# Patient Record
Sex: MALE | Race: WHITE | NOT HISPANIC OR LATINO | Employment: OTHER | URBAN - METROPOLITAN AREA
[De-identification: names, ages, dates, MRNs, and addresses within clinical notes are randomized per-mention and may not be internally consistent; named-entity substitution may affect disease eponyms.]

---

## 2017-02-15 ENCOUNTER — GENERIC CONVERSION - ENCOUNTER (OUTPATIENT)
Dept: OTHER | Facility: OTHER | Age: 67
End: 2017-02-15

## 2017-03-21 ENCOUNTER — GENERIC CONVERSION - ENCOUNTER (OUTPATIENT)
Dept: OTHER | Facility: OTHER | Age: 67
End: 2017-03-21

## 2017-03-21 LAB
LEFT EYE DIABETIC RETINOPATHY: NORMAL
RIGHT EYE DIABETIC RETINOPATHY: NORMAL

## 2017-03-23 ENCOUNTER — ALLSCRIPTS OFFICE VISIT (OUTPATIENT)
Dept: OTHER | Facility: OTHER | Age: 67
End: 2017-03-23

## 2017-03-28 ENCOUNTER — GENERIC CONVERSION - ENCOUNTER (OUTPATIENT)
Dept: OTHER | Facility: OTHER | Age: 67
End: 2017-03-28

## 2017-03-28 LAB
CHOLEST SERPL-MCNC: 236 MG/DL
LDLC SERPL CALC-MCNC: 159 MG/DL
TRIGL SERPL-MCNC: 139 MG/DL

## 2017-06-28 ENCOUNTER — GENERIC CONVERSION - ENCOUNTER (OUTPATIENT)
Dept: OTHER | Facility: OTHER | Age: 67
End: 2017-06-28

## 2017-07-10 ENCOUNTER — ALLSCRIPTS OFFICE VISIT (OUTPATIENT)
Dept: OTHER | Facility: OTHER | Age: 67
End: 2017-07-10

## 2017-08-23 ENCOUNTER — GENERIC CONVERSION - ENCOUNTER (OUTPATIENT)
Dept: OTHER | Facility: OTHER | Age: 67
End: 2017-08-23

## 2017-10-04 ENCOUNTER — GENERIC CONVERSION - ENCOUNTER (OUTPATIENT)
Dept: OTHER | Facility: OTHER | Age: 67
End: 2017-10-04

## 2017-11-13 ENCOUNTER — ALLSCRIPTS OFFICE VISIT (OUTPATIENT)
Dept: OTHER | Facility: OTHER | Age: 67
End: 2017-11-13

## 2017-11-14 NOTE — PROGRESS NOTES
Assessment  1  Former smoker (V15 82) (Z90 749)   2  Ischemic cardiomyopathy (414 8) (I25 5)   3  Diabetes mellitus type 2, controlled, without complications (367 95) (D64 8)   4  Implantable cardioverter-defibrillator (ICD) discharge (V71 89) (Z45 02)   5  Presence of left ventricular assist device (LVAD) (V43 21) (Z95 811)   6  Purpura simplex (287 2) (D69 2)    Discussion/Summary  Discussion Summary:   Are low has a systolic pressure of no more than 84 determined with some difficulty  His lungs are clear he has an implanted ICD auscultating his chest is fasting is he can hear continue some of this of a motor  He has no significant edema because of pain in his foot to remove the shoe and sock of his right foot he certainly has no pulse he has got some venous stasis dermatitis sensation is intact as is the skin  At this point I think he needs to be evaluated by an orthopedist for his leg pain which has become progressively debilitating  He also would benefit by seeing Wound Care to assist in the care of his multiple skin tears purpura simplex  His current medications include atorvastatin 40, Lasix 40 daily, Coumadin 4 milligrams, multiple vitamins, lisinopril 5 milligrams daily, amiodarone 200, potassium chloride 20 milliequivalents, aspirin 325, vitamin-D 4000 daily, and vitamin-C 250  We will see him back in 4 months and assist as much as we can  Chief Complaint  Chief Complaint Free Text Note Form: 4 month follow up  Chief Complaint Chronic Condition [facility]: Patient is here today for follow up of chronic conditions described in HPI  History of Present Illness  HPI: Hussein Leong is here today for visit I have not seen in many months he is now into the care of with [facility]  cardiology and CT surgery and has had a left ventricular assist device implanted over year ago  This was put in by Dr Vj El and has been under the care of it a cardiologist Dr Jeanne Navas  He has a clinical coordinator Lisbeth Murdock   He is on warfarin and aspirin he has had a lot of purpura on his arms he uses Band-Aid for this when he removed the Band-Aid some of the skin tears I really think he needs to be referred to a wound specialist he has also had some pain in his right leg and especially knee  Active Problems  1  Abnormal blood chemistry (790 6) (R79 9)   2  Aortic stenosis (424 1) (I35 0)   3  Atherosclerotic heart disease of native coronary artery without angina pectoris (414 01) (I25 10)   4  Benign essential hypertension (401 1) (I10)   5  Chronic kidney disease, stage 3 (585 3) (N18 3)   6  Diabetes mellitus type 2, controlled, without complications (024 59) (A55 7)   7  Hyperlipidemia (272 4) (E78 5)   8  Implantable cardioverter-defibrillator (ICD) discharge (V71 89) (Z45 02)   9  Ischemic cardiomyopathy (414 8) (I25 5)   10  Presence of left ventricular assist device (LVAD) (V43 21) (Z95 811)    Past Medical History  1  History of congestive heart failure (V12 59) (Z86 79)   2  History of fracture of rib (V15 51) (Z87 81)   3  History of Post herpetic neuralgia (053 19) (B02 29)   4  Screening for genitourinary condition (V81 6) (Z13 89)    Surgical History  1  History of CABG   2  History of Ventricular Assist Device Implantation LVAD    Family History  Mother    1  No pertinent family history    Social History     · Being A Social Drinker   · Former smoker (I62 55) (I79 777)  Social History Reviewed: The social history was reviewed and updated today  The social history was reviewed and is unchanged  Current Meds   1  Amiodarone HCl - 200 MG Oral Tablet; Therapy: 74ZBP6329 to Recorded   2  Aspirin 325 MG CAPS; Therapy: (Juju Large) to Recorded   3  Atorvastatin Calcium 40 MG Oral Tablet; Therapy: 72KGW8038 to Recorded   4  Coumadin 4 MG Oral Tablet; Therapy: (Juju Large) to Recorded   5  Furosemide 20 MG Oral Tablet; TAKE 3 TABLET Daily; Therapy: 25IYE0807 to Recorded   6   Lisinopril 5 MG Oral Tablet; TAKE 1 TABLET DAILY AS DIRECTED; Therapy: (Recorded:13Nov2017) to Recorded   7  Multiple Vitamins Essential Oral Tablet; Therapy: 06CBI1127 to Recorded   8  Potassium Chloride ER 20 MEQ Oral Tablet Extended Release; Take 1 tablet twice daily; Therapy: 94VYI0761 to (Last Rx:05Epx1493) Ordered   9  Vitamin C 250 MG Oral Tablet; Therapy: (Gail Ramirez) to Recorded   10  Vitamin D 2000 UNIT Oral Capsule; Therapy: (Recorded:23Mar2017) to Recorded  Medication List Reviewed: The medication list was reviewed and updated today  Allergies  1  Ancef SOLR    Vitals  Vital Signs    Recorded: 66VLZ5198 11:04AM   Heart Rate 86   Height 5 ft 7 in   Weight 189 lb    BMI Calculated 29 6   BSA Calculated 1 97   O2 Saturation 99       Physical Exam   Constitutional  General appearance: Abnormal  -- Overweight and pale  Ears, Nose, Mouth, and Throat  External inspection of ears and nose: Normal    Pulmonary  Respiratory effort: No increased work of breathing or signs of respiratory distress  Auscultation of lungs: Clear to auscultation, equal breath sounds bilaterally, no wheezes, no rales, no rhonci     Cardiovascular  Auscultation of heart: Abnormal    Examination of extremities for edema and/or varicosities: Normal    Musculoskeletal  Gait and station: Normal    Skin  Skin and subcutaneous tissue: Abnormal    Psychiatric  Orientation to person, place and time: Normal    Mood and affect: Normal    Diabetic Foot Screen: Abnormal        Signatures   Electronically signed by : NILSON Fuentes ; Nov 13 2017 11:45AM EST                       (Author)

## 2018-01-03 ENCOUNTER — GENERIC CONVERSION - ENCOUNTER (OUTPATIENT)
Dept: INTERNAL MEDICINE CLINIC | Facility: CLINIC | Age: 68
End: 2018-01-03

## 2018-01-11 NOTE — MISCELLANEOUS
Assessment    1  Implantable cardioverter-defibrillator (ICD) discharge (V71 89) (Z45 02)   2  Ischemic cardiomyopathy (414 8) (I25 5)   3  Diabetes mellitus type 2, controlled, without complications (167 93) (B57 5)   4  Abnormal blood chemistry (790 6) (R79 9)   5  Congestive heart failure (428 0) (I50 9)    Plan  Aortic stenosis    · (1) COMPREHENSIVE METABOLIC PANEL; Status:Active - Retrospective Authorization; Requested for:26Apr2016;    Perform:[facility]; Due:26Apr2017; Last Updated By:Reilly Ahmadi; 4/26/2016 9:38:30 AM;Ordered; For:Aortic stenosis; Ordered By:Jackie Han;  Diabetes mellitus type 2, controlled, without complications    · (1) HEMOGLOBIN A1C; Status:Active - Retrospective Authorization; Requested  for:26Apr2016;    Perform:[facility]; Due:26Apr2017; Last Updated By:Reilly Ahmadi; 4/26/2016 9:38:30 AM;Ordered; For:Diabetes mellitus type 2, controlled, without complications; Ordered By:Jackie Han;   · Blood Glucose- POC; Status:Complete - Retrospective Authorization;   Done: 64DIZ5228  09:35AM   Performed: In Office; Due:26Apr2017; Last Updated By:Reilly Ahmadi; 4/26/2016 9:35:39 AM;Ordered; For:Diabetes mellitus type 2, controlled, without complications; Ordered By:Alexis Han;  Fasting (Y/N) : No - N    Discussion/Summary  Discussion Summary:   Georgi appears a bit pale and chronically this is not new with the patient at about 40 degrees  he has modest neck vein distention lungs are clear pacemaker generator is palpable the chest there no thrills I do not hear a gallop is in a regular rhythm and no murmur he has a questionable hepatojugular reflux are +1 to +2 peripheral edema   A fingerstick sugar in the office postprandially was 174 we're able to review some lab studies I believe when the gets it when Santosh Padron went to the hospital his electrolytes were reasonably normal and some electrolytes done on 419 showed sugar 254 creatinine 1 7 and a sodium of 1:30  Previously been notified I wonder cardiologist to stop his diuretics and after this was done he became more short of breath this point water recommend that he discontinue the lisinopril and restart the spironolactone which she feels is really helped him and also whatever Lasix he needs to minimize his dyspnea  He will have a set of electrolytes and A1c done in a day or 2 I don't believe that the diabetes requires any specific medication at this point  I did discuss the situation over the phone with Dr Dre Owen the patient's cardiologist will be seeing him in 3 days  The patient's cardiac function is very compromised and I believe his prognosis is poor  History of Present Illness  TCM Communication [facility]: The patient is being contacted for follow-up after hospitalization  Hospital course was discussed with the inpatient physician and records were not available  He was hospitalized at [facility]  The date of admission: 04/12/2016, date of discharge: 04/14/2016  He was discharged to home  Smoking status was reviewed and is accurate in Social History  Smoking Cessation Medications/Patches not provided during hospital stay  Patient states they need a refill before next appointment  Medications were not reviewed today  He scheduled a follow up appointment  Communication performed and completed by Seble Sandoval   HPI: Selena Gomez is here today for post hospital visit  Water about 4 at 1216 ICD went off the instrument was interrogated by phone was determined that it actually had one half twice Georgi went to the hospital was admitted to [facility] was seen by Dr Ziggy Cunningham his cardiologist and also numbness of the electrophysiology team he was started on amiodarone with loading dose and is now well and will be starting 400 mg a day tomorrow  Active Problems    1  Abnormal blood chemistry (790 6) (R79 9)   2  Aortic stenosis (747 22) (Q25 3)   3   Atherosclerotic heart disease of native coronary artery without angina pectoris (414 01)   (I25 10)   4  Benign essential hypertension (401 1) (I10)   5  Congestive heart failure (428 0) (I50 9)   6  Diabetes mellitus type 2, controlled, without complications (636 19) (A63 4)   7  Herpes zoster (053 9) (B02 9)   8  Hyperlipidemia (272 4) (E78 5)   9  Post herpetic neuralgia (053 19) (B02 29)    Past Medical History    1  Encounter for screening colonoscopy (V76 51) (Z12 11)   2  Screening for genitourinary condition (V81 6) (Z13 89)    Surgical History    1  History of CABG    Family History  Mother    1  No pertinent family history    Social History    · Being A Social Drinker   · Former smoker (Z76 77) (R06 836)   · Never A Smoker    Current Meds   1  Carvedilol 12 5 MG Oral Tablet; TAKE 1 TABLET TWICE DAILY; Therapy: 63Uuy4606 to (Evaluate:02Uoi2574)  Requested for: 06Ojx1338; Last   Rx:39Sga9925 Ordered   2  Digoxin 125 MCG Oral Tablet; Therapy: 98CWQ4953 to (Evaluate:11Mar2015) Recorded   3  Furosemide 40 MG Oral Tablet; Therapy: 08DAD4024 to (Evaluate:11Mar2015) Recorded   4  Lidocaine HCl - 3 % External Cream; USE TOPICALLY AS DIRECTED; Therapy: 00JTA0067 to (Last Rx:99Mcz6806)  Requested for: 12Dec2014 Ordered   5  Lipitor 40 MG Oral Tablet; TAKE 1 TABLET DAILY AT BEDTIME; Therapy: 40LPY8410 to Recorded   6  Lisinopril 5 MG Oral Tablet; Therapy: 82NHY0375 to ([facility])  Requested for: 29Oct2014 Recorded   7  Lyrica 50 MG Oral Capsule; TAKE 1 CAPSULE TWICE DAILY; Therapy: 11FIL5936 to (Evaluate:10Jan2015); Last Rx:24Xwr9844 Ordered   8  Magnesium Oxide 400 MG Oral Capsule; Therapy: 04CCY5775 to Recorded   9  Multiple Vitamins Essential Oral Tablet; Therapy: 73GWW4972 to Recorded   10  Plavix 75 MG Oral Tablet; Therapy: 05LMZ6554 to Recorded   11  Spironolactone 25 MG Oral Tablet; take 1/2 tab every other day; Therapy: 27MRK2693 to Recorded    Allergies    1   No Known Drug Allergies    Vitals  Signs [Data Includes: Current Encounter]   Recorded: 26Apr2016 09:39AM   Pulse Quality: Normal  Systolic: 873  Diastolic: 70    Physical Exam    Constitutional   General appearance: Abnormal     Eyes   Conjunctiva and lids: No swelling, erythema, or discharge  Pulmonary   Respiratory effort: No increased work of breathing or signs of respiratory distress  Auscultation of lungs: Clear to auscultation, equal breath sounds bilaterally, no wheezes, no rales, no rhonci  Cardiovascular   Palpation of heart: Abnormal     Auscultation of heart: Normal rate and rhythm, normal S1 and S2, without murmurs  Examination of extremities for edema and/or varicosities: Abnormal     Abdomen   Abdomen: Non-tender, no masses  Liver and spleen: No hepatomegaly or splenomegaly  Psychiatric   Orientation to person, place and time: Normal     Mood and affect: Normal          Results/Data  Encounter Results   Blood Glucose- POC 26Apr2016 09:35AM [facility]     Test Name Result Flag Reference   Glucose Finger Stick 174         Future Appointments    Date/Time Provider Specialty Site   05/23/2016 10:40 NILSON Galeana   Internal Medicine [facility]     Signatures   Electronically signed by : Nikunj Negron, ; Apr 26 2016  9:15AM EST                       (Author)    Electronically signed by : NILSON Ramirez ; Apr 26 2016  9:50AM EST                       (Author)

## 2018-01-12 VITALS — HEIGHT: 67 IN | HEART RATE: 86 BPM | WEIGHT: 189 LBS | BODY MASS INDEX: 29.66 KG/M2 | OXYGEN SATURATION: 99 %

## 2018-01-13 VITALS — HEART RATE: 85 BPM | WEIGHT: 179.4 LBS | BODY MASS INDEX: 28.1 KG/M2 | RESPIRATION RATE: 18 BRPM

## 2018-01-14 VITALS
WEIGHT: 185.5 LBS | RESPIRATION RATE: 16 BRPM | HEART RATE: 69 BPM | BODY MASS INDEX: 29.11 KG/M2 | OXYGEN SATURATION: 97 % | HEIGHT: 67 IN

## 2018-01-16 NOTE — PROGRESS NOTES
Assessment    1  Congestive heart failure (428 0) (I50 9)   2  Diabetes mellitus type 2, controlled, without complications (742 88) (E64 5)   3  Hyperlipidemia (272 4) (E78 5)   4  Aortic stenosis (747 22) (Q25 3)    Discussion/Summary  Discussion Summary:   Are low looks tired and chronically ill  This is not new his heart lungs unremarkable is no edema  I  his medication list appears quite adequate and does not need to be changed  He will continue with digoxin 0 125, Lasix 40 in the morning and 20 in the afternoon, the door 40, magnesium oxide 400, Plavix 75, spironolactone 12 5 daily, carvedilol 12 5 twice a day, lisinopril 5,  I have asked the Georgi did discuss with Dr Devin Soulier the possibility of gradually increasing the spironolactone to a full tablet daily  I've also strongly urged him to abandon the upper and lower GI endoscopy  His hemoglobin is adequate he is no digestive symptoms and because of his very tenuous cardiac situation the an endoscopy seems to be an unnecessary and very risky procedure  We discussed his diabetes which is been present for a while his A1c is creeping up we made some dietary recommendations  At this when I don't believe that medication is likely t to be beneficial  A nutritionist was suggested we'll see him back in 3 months  Chief Complaint  Chief Complaint Free Text Note Form: 2 month follow up  History of Present Illness  HPI: Dipesh Quiñones is here today for a visit he seems reasonably stable he will be seeing his cardiologist Dr Chantell Dover tomorrow  He had some labs done at the [place] in December which are basically satisfactory get an A1c is 7 7 and a normal dig level and electrolytes  [facility] and [facility]   He at has been evaluated also for left ventricular assist device and has been [address] his heart failure program for evaluation previously ureide at this point is reasonably stable his medications of been adjusted he tires easily but is not short of breath is had no chest pain in his is condition appears to stabilize over the last several months  Active Problems    1  Abnormal blood chemistry (790 6) (R79 9)   2  Aortic stenosis (747 22) (Q25 3)   3  Atherosclerotic heart disease of native coronary artery without angina pectoris (414 01) (I25 10)   4  Benign essential hypertension (401 1) (I10)   5  Congestive heart failure (428 0) (I50 9)   6  Herpes zoster (053 9) (B02 9)   7  Hyperlipidemia (272 4) (E78 5)   8  Post herpetic neuralgia (053 19) (B02 29)    Past Medical History    1  Encounter for screening colonoscopy (V76 51) (Z12 11)   2  History of Ischemic cardiomyopathy (414 8) (I25 5)   3  Screening for genitourinary condition (V81 6) (Z13 89)    Surgical History    1  History of CABG    Family History    1  No pertinent family history    Social History    · Being A Social Drinker   · Former smoker (S15 19) (A45 822)   · Never A Smoker    Current Meds   1  Carvedilol 12 5 MG Oral Tablet; TAKE 1 TABLET TWICE DAILY; Therapy: 41Zgo9872 to (Evaluate:68Hat4952)  Requested for: 77Zyt1822; Last Rx:00Omb4036   Ordered   2  Digoxin 125 MCG Oral Tablet; Therapy: 89JNA8322 to (Evaluate:11Mar2015) Recorded   3  Furosemide 40 MG Oral Tablet; Therapy: 69VBD7667 to (Evaluate:11Mar2015) Recorded   4  Lidocaine HCl - 3 % External Cream; USE TOPICALLY AS DIRECTED; Therapy: 06WGR8240 to (Last Rx:51Biw4724)  Requested for: 12Dec2014 Ordered   5  Lipitor 40 MG Oral Tablet; TAKE 1 TABLET DAILY AT BEDTIME; Therapy: 92JZO1232 to Recorded   6  Lisinopril 5 MG Oral Tablet; Therapy: 09UQY8555 to (Sarah Spain)  Requested for: 29Oct2014 Recorded   7  Lyrica 50 MG Oral Capsule; TAKE 1 CAPSULE TWICE DAILY; Therapy: 46KGY1688 to (Evaluate:10Jan2015); Last Rx:81Hkm6234 Ordered   8  Magnesium Oxide 400 MG Oral Capsule; Therapy: 94KHT4936 to Recorded   9  Multiple Vitamins Essential Oral Tablet;    Therapy: 00QQD2298 to Recorded   10  Plavix 75 MG Oral Tablet; Therapy: 43CRL1123 to Recorded   11  Spironolactone 25 MG Oral Tablet; take 1/2 tab every other day; Therapy: 84RYF0481 to Recorded    Allergies    1  No Known Drug Allergies    Vitals  Vital Signs [Data Includes: Current Encounter]    Recorded: 72Jbl5801 10:23AM Recorded: 58Yhj2771 09:44AM   Temperature  96 4 F, Tympanic   Heart Rate  69   Pulse Quality Normal    Systolic 638    Diastolic 70    Height  5 ft 7 in   Weight  177 lb 4 00 oz   BMI Calculated  27 76   BSA Calculated  1 92   O2 Saturation  96     Physical Exam    Constitutional   General appearance: Abnormal     Eyes   Conjunctiva and lids: No swelling, erythema, or discharge  Pulmonary   Respiratory effort: No increased work of breathing or signs of respiratory distress  Auscultation of lungs: Clear to auscultation, equal breath sounds bilaterally, no wheezes, no rales, no rhonci  Cardiovascular   Auscultation of heart: Normal rate and rhythm, normal S1 and S2, without murmurs      Examination of extremities for edema and/or varicosities: Normal     Carotid pulses: Normal     Psychiatric   Orientation to person, place and time: Normal     Mood and affect: Normal          Signatures   Electronically signed by : RICHIE Ramirez MD; Feb 25 2016 10:27AM EST                       (Author)

## 2018-01-16 NOTE — MISCELLANEOUS
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 angina pectoris (414 01)   (I25 10)    Discussion/Summary  Discussion Summary:   Dipesh Quiñones was seen and examined in the office today  Please see HPI for details  Recent hospitalization was reviewed with him  He is now s/p abx therapy  Of note, there was a rash noted that was thought possibly to be secondary to Ancef  He was given one dose of Daptomycin for his underlying sepsis after the Ancef was stopped  Digoxin was stopped secondary to elevated levels and will follow with his cardiologist   Otherwise no other changes were made  Counseling Documentation With Imm: The patient was counseled regarding instructions for management  Medication SE Review and Pt Understands Tx: The treatment plan was reviewed with the patient/guardian  The patient/guardian understands and agrees with the treatment plan      History of Present Illness  TCM Communication [facility]: The patient is being contacted for follow-up after hospitalization  He was hospitalized [facility]  The date of admission: 9/8/16, date of discharge: 9/13/16  Diagnosis: infected picc line  He was discharged to home  Counseling was provided to  [facility] nurse at [facility] cardiology  Communication performed and completed by cristian   HPI: Dipesh Quiñones is here today for a follow up from his recent hospitalization  He has known ischemic heart disease and cardiomyopathy  He is currently followed by the cardiology group at [facility]  He was also seen by [facility] Cardiology for evaluation of an LVAD  This apparently is scheduled for some time in October with [facility]  Recent history is as follows  He was started on milrinone therapy with PICC placement on the right UE   He reported having chills and was subsequently directed to the hospital  Blood cultures revealed Staph Psuedointermedius and was started on Ancef therapy until 9/20  PICC site was switched to the left  He was also subsequently found to have RUE DVT and started on Lovenox and Coumadin  He was subsequently discharged home  He started to having uncontrolled bleeding around the PICC site and was readmitted to the hospital on 9/18/16 secondary to this and epistaxis  Cardiology was also consulted and it was felt that the Plavix can now be stopped  He has been doing okay since his most recent hospitalization  Coumadin is being managed by the cardiology team and he has an appointment next week with them  Medications were reviewed and updated with Georgi  He otherwise denies any other related complaints  Review of Systems  Complete-Male:   Constitutional: feeling tired, but No fever or chills, feels well, no tiredness, no recent weight gain or weight loss  Cardiovascular: No complaints of slow heart rate, no fast heart rate, no chest pain, no palpitations, no leg claudication, no lower extremity  Respiratory: No complaints of shortness of breath, no wheezing, no cough, no SOB on exertion, no orthopnea or PND  Gastrointestinal: No complaints of abdominal pain, no constipation, no nausea or vomiting, no diarrhea or bloody stools  Integumentary: skin wound  Neurological: No compliants of headache, no confusion, no convulsions, no numbness or tingling, no dizziness or fainting, no limb weakness, no difficulty walking  Hematologic/Lymphatic: a tendency for easy bleeding and a tendency for easy bruising, but No complaints of swollen glands, no swollen glands in the neck, does not bleed easily, no easy bruising  ROS Reviewed:   ROS reviewed  Active Problems    1  Abnormal blood chemistry (790 6) (R79 9)   2  Aortic stenosis (424 1) (I35 0)   3  Atherosclerotic heart disease of native coronary artery without angina pectoris (414 01)   (I25 10)   4  Benign essential hypertension (401 1) (I10)   5   Congestive heart failure (428 0) (I50 9)   6  Diabetes mellitus type 2, controlled, without complications (857 64) (M70 3)   7  Hyperlipidemia (272 4) (E78 5)   8  Implantable cardioverter-defibrillator (ICD) discharge (V71 89) (Z45 02)   9  Ischemic cardiomyopathy (414 8) (I25 5)   10  Post herpetic neuralgia (053 19) (B02 29)    Past Medical History    1  Encounter for screening colonoscopy (V76 51) (Z12 11)   2  Screening for genitourinary condition (V81 6) (Z13 89)    Surgical History    1  History of CABG    Family History  Mother    1  No pertinent family history    Social History    · Being A Social Drinker   · Former smoker (T09 68) (Y99 543)   · Never A Smoker    Current Meds   1  Carvedilol 12 5 MG Oral Tablet; TAKE 1 TABLET TWICE DAILY; Therapy: 42Oxs0724 to (Evaluate:05Pjh9541)  Requested for: 12Oat7153; Last   Rx:06Uzz4812 Ordered   2  Coumadin 5 MG Oral Tablet; Therapy: 36GQX0832 to Recorded   3  Digoxin 125 MCG Oral Tablet; Therapy: 64RKE0258 to (Evaluate:11Mar2015) Recorded   4  Furosemide 40 MG Oral Tablet; Therapy: 35CZD8596 to (Evaluate:11Mar2015) Recorded   5  Lidocaine HCl - 3 % External Cream; USE TOPICALLY AS DIRECTED; Therapy: 82WBT2809 to (Last Rx:86Igk2669)  Requested for: 32Lwl5635 Ordered   6  Lipitor 40 MG Oral Tablet; TAKE 1 TABLET DAILY AT BEDTIME; Therapy: 96RSU6726 to Recorded   7  Lisinopril 5 MG Oral Tablet; Therapy: 25UZQ6856 to (Orion Boyd)  Requested for: 08TEQ1575 Recorded   8  Lyrica 50 MG Oral Capsule; TAKE 1 CAPSULE TWICE DAILY; Therapy: 95HUT9736 to (Evaluate:10Jan2015); Last Rx:11Ers1162 Ordered   9  Magnesium Oxide 400 MG Oral Capsule; Therapy: 51NMI3873 to Recorded   10  Milrinone in Dextrose 20 MG/100ML Intravenous Solution; Therapy: 16UPV8761 to Recorded   11  Multiple Vitamins Essential Oral Tablet; Therapy: 25KFF6973 to Recorded   12  Plavix 75 MG Oral Tablet; Therapy: 10RNP9098 to Recorded   13   Spironolactone 25 MG Oral Tablet; take 1/2 tab every other day; Therapy: 94BOS5768 to Recorded    Allergies     1  Ancef SOLR    No Known Drug Allergies     Vitals  Signs   Recorded: 34RJI0879 10:26AM   Heart Rate: 74  O2 Saturation: 94  Height: 5 ft 7 in  Weight: 177 lb 2 08 oz  BMI Calculated: 27 74  BSA Calculated: 1 92    Physical Exam    Constitutional   General appearance: No acute distress, well appearing and well nourished  Eyes   Conjunctiva and lids: No swelling, erythema, or discharge  Pulmonary   Respiratory effort: No increased work of breathing or signs of respiratory distress  Auscultation of lungs: Clear to auscultation, equal breath sounds bilaterally, no wheezes, no rales, no rhonci  Cardiovascular   Auscultation of heart: Abnormal   Distant heart sounds  Examination of extremities for edema and/or varicosities: Normal     Abdomen   Abdomen: Non-tender, no masses  Lymphatic   Palpation of lymph nodes in neck: No lymphadenopathy  Musculoskeletal   Gait and station: Normal     Skin   Skin and subcutaneous tissue: Abnormal   PICC line on LUE; erythematous rash involving the LUE  Psychiatric   Orientation to person, place and time: Normal     Mood and affect: Normal          Future Appointments    Date/Time Provider Specialty Site   10/25/2016 10:20 AM NILSON Sanchez  Internal Medicine [facility] COURT IM   12/22/2016 10:40 AM NILSON Sanchez   Internal Medicine [facility]     Signatures   Electronically signed by : Candance Morelle, DO; Sep 22 2016 12:21PM EST                       (Author)

## 2018-01-17 NOTE — MISCELLANEOUS
Assessment    1  Ischemic cardiomyopathy (414 8) (I25 5)   2  Congestive heart failure (428 0) (I50 9)   3  Implantable cardioverter-defibrillator (ICD) discharge (V71 89) (Z45 02)   4  Diabetes mellitus type 2, controlled, without complications (158 27) (O12 4)    Plan  Diabetes mellitus type 2, controlled, without complications    · *VB-Foot Exam; Status:Complete - Retrospective Authorization;   Done: 43NGI8769  09:53AM   Performed: In Office; Due:11Jun2016; Last Updated By:Reilly Ahmadi; 6/6/2016 9:53:14 AM;Ordered; For:Diabetes mellitus type 2, controlled, without complications; Ordered By:Tim Jack;    Discussion/Summary  Discussion Summary:   Georgi actually looks reasonably well I've seen him in the past looking much worse than this  The defibrillator is palpable left anterior part of the chest is in a regular rhythm is actually no murmurs lungs are clear there is no significant edema he does however look withdrawn depressed and dejected his current medications will include Lasix 40 mg alternating with 80, Lipitor 40, magnesium oxide 400, Plavix 75, spironolactone 12 5 daily, carvedilol 6 25 twice a day, lisinopril 10, and amiodarone 200 mg daily  We will send him today for CBC BMP and a magnesium level  He is a diabetic been reluctant to start one of many medications for his diabetes as I think is cardiac situation is preeminent  Chief Complaint  Chief Complaint Free Text Note Form: HEMALATHA      History of Present Illness  TCM Communication [facility]: The patient is being contacted for follow-up after hospitalization  Hospital records were not available and records requested  He was hospitalized at [facility]  The date of admission: 05/19/2016, date of discharge: 05/28/2016  Diagnosis: CHF  He was discharged to home  Medications were not reviewed today  He scheduled a follow up appointment  The patient is currently asymptomatic  Counseling was provided to the patient and patient's family  Communication performed and completed by Ruby Allen   HPI: Aldair Williamson is here today for post hospital visit he was hospitalized at [facility] and made in 19th with fatigue shortness of breath he was evaluated by multiple cardiologists wound up with a [place] a variety of studies required dobutamine drip and eventually milrinone to diuresis and he was in his medications were modified and he was discharged from the hospital improved  He saw Dr Betzy Munoz on 1 June his cardiac failure specialist and is being referred to nurse to [place] for incineration of the left ventricular assist device possibly eventually cardiac transplantation he is tired dejected depressed although is no longer short of breath      Active Problems    1  Abnormal blood chemistry (790 6) (R79 9)   2  Aortic stenosis (747 22) (Q25 3)   3  Atherosclerotic heart disease of native coronary artery without angina pectoris (414 01)   (I25 10)   4  Benign essential hypertension (401 1) (I10)   5  Congestive heart failure (428 0) (I50 9)   6  Diabetes mellitus type 2, controlled, without complications (331 70) (L96 5)   7  Herpes zoster (053 9) (B02 9)   8  Hyperlipidemia (272 4) (E78 5)   9  Implantable cardioverter-defibrillator (ICD) discharge (V71 89) (Z45 02)   10  Ischemic cardiomyopathy (414 8) (I25 5)   11  Post herpetic neuralgia (053 19) (B02 29)    Past Medical History    1  Encounter for screening colonoscopy (V76 51) (Z12 11)   2  Screening for genitourinary condition (V81 6) (Z13 89)    Surgical History    1  History of CABG    Family History  Mother    1  No pertinent family history    Social History    · Being A Social Drinker   · Former smoker (V26 65) (Q95 904)   · Never A Smoker    Current Meds   1  Carvedilol 12 5 MG Oral Tablet; TAKE 1 TABLET TWICE DAILY; Therapy: 78Baz2371 to (Evaluate:22Fqm9077)  Requested for: 04Sep2013; Last   Rx:04Sep2013 Ordered   2  Digoxin 125 MCG Oral Tablet;    Therapy: 18TYD4189 to (Evaluate:11Mar2015) Recorded   3  Furosemide 40 MG Oral Tablet; Therapy: 77WWR1465 to (Evaluate:11Mar2015) Recorded   4  Lidocaine HCl - 3 % External Cream; USE TOPICALLY AS DIRECTED; Therapy: 15WIE4025 to (Last Rx:15Wdl7504)  Requested for: 02Pag8925 Ordered   5  Lipitor 40 MG Oral Tablet; TAKE 1 TABLET DAILY AT BEDTIME; Therapy: 34STE7643 to Recorded   6  Lisinopril 5 MG Oral Tablet; Therapy: 94SPT5322 to ([facility])  Requested for: 29Oct2014 Recorded   7  Lyrica 50 MG Oral Capsule; TAKE 1 CAPSULE TWICE DAILY; Therapy: 53HZA7974 to (Evaluate:10Jan2015); Last Rx:46Vxi9529 Ordered   8  Magnesium Oxide 400 MG Oral Capsule; Therapy: 55OIM8427 to Recorded   9  Multiple Vitamins Essential Oral Tablet; Therapy: 60SWR9342 to Recorded   10  Plavix 75 MG Oral Tablet; Therapy: 78YOY1553 to Recorded   11  Spironolactone 25 MG Oral Tablet; take 1/2 tab every other day; Therapy: 57BGU5133 to Recorded    Allergies    1  No Known Drug Allergies    Vitals  Signs [Data Includes: Current Encounter]   Recorded: 19XXV2489 10:02AM   Pulse Quality: Normal  Systolic: 92  Diastolic: 60    Physical Exam    Constitutional   General appearance: Abnormal   Chronically ill  Eyes   Conjunctiva and lids: No swelling, erythema, or discharge  Pulmonary   Auscultation of lungs: Clear to auscultation, equal breath sounds bilaterally, no wheezes, no rales, no rhonci  Cardiovascular   Palpation of heart: Abnormal     Auscultation of heart: Normal rate and rhythm, normal S1 and S2, without murmurs      Examination of extremities for edema and/or varicosities: Normal     Carotid pulses: Normal     Psychiatric   Orientation to person, place and time: Normal     Mood and affect: Abnormal          Results/Data  Encounter Results   *VB-Foot Exam 49PLO8936 09:53AM Becky Raw     Test Name Result Flag Reference   FOOT EXAM [id number]         Signatures   Electronically signed by : NILSON Ma ; Jun 6 2016 10: 05AM EST                       (Author)

## 2018-04-03 ENCOUNTER — OFFICE VISIT (OUTPATIENT)
Dept: INTERNAL MEDICINE CLINIC | Facility: CLINIC | Age: 68
End: 2018-04-03
Payer: MEDICARE

## 2018-04-03 VITALS
HEART RATE: 66 BPM | OXYGEN SATURATION: 94 % | DIASTOLIC BLOOD PRESSURE: 80 MMHG | WEIGHT: 189 LBS | SYSTOLIC BLOOD PRESSURE: 100 MMHG | HEIGHT: 67 IN | BODY MASS INDEX: 29.66 KG/M2

## 2018-04-03 DIAGNOSIS — I10 BENIGN ESSENTIAL HYPERTENSION: Primary | ICD-10-CM

## 2018-04-03 PROBLEM — E55.9 VITAMIN D DEFICIENCY: Status: ACTIVE | Noted: 2017-01-18

## 2018-04-03 PROBLEM — E03.9 ACQUIRED HYPOTHYROIDISM: Status: ACTIVE | Noted: 2017-03-22

## 2018-04-03 PROBLEM — N18.30 CHRONIC KIDNEY DISEASE, STAGE 3 (HCC): Status: ACTIVE | Noted: 2017-07-10

## 2018-04-03 PROCEDURE — 99213 OFFICE O/P EST LOW 20 MIN: CPT | Performed by: INTERNAL MEDICINE

## 2018-04-03 RX ORDER — FAMOTIDINE 20 MG/1
20 TABLET, FILM COATED ORAL
COMMUNITY
Start: 2017-10-04 | End: 2018-10-04

## 2018-04-03 RX ORDER — ATORVASTATIN CALCIUM 40 MG/1
TABLET, FILM COATED ORAL
COMMUNITY
Start: 2017-03-23

## 2018-04-03 RX ORDER — ECHINACEA PURPUREA EXTRACT 125 MG
1 TABLET ORAL
COMMUNITY
Start: 2016-12-12

## 2018-04-03 RX ORDER — LISINOPRIL 5 MG/1
5 TABLET ORAL
COMMUNITY
Start: 2017-10-04 | End: 2018-10-04

## 2018-04-03 RX ORDER — BUTALBITAL, ASPIRIN, AND CAFFEINE 50; 325; 40 MG/1; MG/1; MG/1
CAPSULE ORAL
COMMUNITY

## 2018-04-03 RX ORDER — FUROSEMIDE 40 MG/1
20 TABLET ORAL
COMMUNITY
Start: 2017-10-04

## 2018-04-03 RX ORDER — MULTIVIT-MIN/IRON/FOLIC ACID/K 18-600-40
CAPSULE ORAL
COMMUNITY

## 2018-04-03 RX ORDER — POTASSIUM CHLORIDE 20 MEQ/1
20 TABLET, EXTENDED RELEASE ORAL
COMMUNITY
Start: 2017-10-04

## 2018-04-03 RX ORDER — AMIODARONE HYDROCHLORIDE 200 MG/1
400 TABLET ORAL
COMMUNITY
Start: 2017-03-23

## 2018-04-03 RX ORDER — WARFARIN SODIUM 4 MG/1
TABLET ORAL
COMMUNITY
End: 2020-01-06 | Stop reason: SDUPTHER

## 2018-04-03 RX ORDER — MULTIVIT WITH MINERALS/LUTEIN
500 TABLET ORAL
COMMUNITY
Start: 2017-02-08

## 2018-04-03 NOTE — PROGRESS NOTES
Assessment/Plan:    No problem-specific Assessment & Plan notes found for this encounter  Diagnoses and all orders for this visit:    Benign essential hypertension    Other orders  -     amiodarone 200 mg tablet; Take by mouth  -     ascorbic acid (VITAMIN C) 250 mg tablet; Take 500 mg by mouth  -     butalbital-aspirin-caffeine (FIORINAL) -40 mg capsule; Take by mouth  -     atorvastatin (LIPITOR) 40 mg tablet; Take by mouth  -     warfarin (COUMADIN) 4 mg tablet; Take by mouth  -     famotidine (PEPCID) 20 mg tablet; Take 20 mg by mouth  -     furosemide (LASIX) 40 mg tablet; Take 40 mg by mouth  -     lisinopril (ZESTRIL) 5 mg tablet; Take 5 mg by mouth  -     MULTIPLE VITAMINS ESSENTIAL PO; Take by mouth  -     potassium chloride (K-DUR,KLOR-CON) 20 mEq tablet; Take 40 mEq by mouth  -     sodium chloride (OCEAN) 0 65 % nasal spray; 1 spray into each nostril  -     Cholecalciferol (VITAMIN D) 2000 units CAPS; Take by mouth        Subjective:      Patient ID: Erna Leyden is a 70 y o  male  HPI     The following portions of the patient's history were reviewed and updated as appropriate: allergies, current medications, past family history, past medical history, past social history, past surgical history and problem list     Are low is here today for 4 month visit he actually is doing quite well he has had his left ventricular assist device in place for approximately a year and a half  Over that time the quality of his life is significantly improved he is no longer short of breath he is able to drive do some things around the house and actually is feeling pretty  he was implanted by Dr Chinyere Campos [facility] and he has a excellent staff of people who very carefully and meticulously monitor his medications lab studies and progress  He occasionally has had some trouble with it balance and coordination and the folks at [facility] are considering whether a neurologic evaluation might be in order  As a result of his implanted hardware an ICD he is probably not eligible to have magnetic resonance imaging of the brain and I am not sure how much a CT scan would tell certainly even though he is anticoagulated in maybe that he has had some micro emboli affecting his balance but at this point it seems to be minimally symptomatic  He is having trouble with his knee and will may need some type of intervention there all-in-all considering his illness he seems to be faring very well we are able to review on the computer some lab studies that he has had including a prothrombin time and some basic chemistries which are are generally satisfactory he remains on amiodarone and gets TSH levels on a regular basis as well as electrolytes etc   Review of Systems      Objective:      /80   Pulse 66   Ht 5' 7" (1 702 m)   Wt 85 7 kg (189 lb)   SpO2 94%   BMI 29 60 kg/m²          Physical Exam      Are low appears comfortable in no distress we did watch him ambulate in out of the office without any difficulty his blood pressure is 100/80 his apical impulse is not palpable he appears to be in a regular rhythm and when auscultating his heart is the heart sounds are are generally normal but she can hear a perpetual home of his device  There is no significant edema in the lungs are clear are low certainly is stable the prove min and the quality of his life mate with a left ventricular assist device is quite gratifying    He he is up-to-date with the various immunizations I will see him back in 4 months for surveillance and assist his cardiac team in any way possible

## 2018-08-06 ENCOUNTER — OFFICE VISIT (OUTPATIENT)
Dept: INTERNAL MEDICINE CLINIC | Facility: CLINIC | Age: 68
End: 2018-08-06
Payer: MEDICARE

## 2018-08-06 VITALS
HEART RATE: 86 BPM | BODY MASS INDEX: 29.66 KG/M2 | OXYGEN SATURATION: 97 % | WEIGHT: 189 LBS | HEIGHT: 67 IN | TEMPERATURE: 99.9 F

## 2018-08-06 DIAGNOSIS — I49.40 CARDIAC ARRHYTHMIA DUE TO PREMATURE DEPOLARIZATION, UNSPECIFIED TYPE: ICD-10-CM

## 2018-08-06 DIAGNOSIS — D64.9 ANEMIA, UNSPECIFIED TYPE: ICD-10-CM

## 2018-08-06 DIAGNOSIS — E53.8 DEFICIENCY OF OTHER SPECIFIED B GROUP VITAMINS: ICD-10-CM

## 2018-08-06 DIAGNOSIS — Z11.59 NEED FOR HEPATITIS C SCREENING TEST: Primary | ICD-10-CM

## 2018-08-06 DIAGNOSIS — Z13.6 ENCOUNTER FOR ABDOMINAL AORTIC ANEURYSM (AAA) SCREENING: ICD-10-CM

## 2018-08-06 DIAGNOSIS — E03.9 ACQUIRED HYPOTHYROIDISM: ICD-10-CM

## 2018-08-06 DIAGNOSIS — D52.0 DIETARY FOLATE DEFICIENCY ANEMIA: ICD-10-CM

## 2018-08-06 DIAGNOSIS — I25.10 ATHEROSCLEROSIS OF NATIVE CORONARY ARTERY OF NATIVE HEART WITHOUT ANGINA PECTORIS: ICD-10-CM

## 2018-08-06 PROCEDURE — 99213 OFFICE O/P EST LOW 20 MIN: CPT | Performed by: INTERNAL MEDICINE

## 2018-08-06 NOTE — PROGRESS NOTES
Assessment/Plan:    No problem-specific Assessment & Plan notes found for this encounter  Diagnoses and all orders for this visit:    Need for hepatitis C screening test  -     Hepatitis C antibody; Future    Encounter for abdominal aortic aneurysm (AAA) screening    Acquired hypothyroidism    Atherosclerosis of native coronary artery of native heart without angina pectoris  -     TIBC; Future    Anemia, unspecified type  -     Ferritin; Future  -     Folate; Future  -     Vitamin B12; Future    Cardiac arrhythmia due to premature depolarization, unspecified type   -     TIBC; Future    Deficiency of other specified B group vitamins   -     Folate; Future    Dietary folate deficiency anemia   -     Vitamin B12; Future    Other orders  -     Cancel: US abdominal aorta screening aaa; Future  -     aspirin 81 MG tablet; Daily        Subjective:      Patient ID: Anastacio Burkitt is a 70 y o  male  HPI  Naseem Zuleta  is here today for visit he feels well and has actually been quite stable  In almost 2 years since he had a left ventricular assist device implanted  He also has an  Implanted AICD fortunately which has not gone off for many months he seen regularly for anticoagulation management and general surveillance and is really received excellent care from the cardiology staff at [facility] this point he really has no major complaints except for ex extensive purpura simplex    The following portions of the patient's history were reviewed and updated as appropriate: allergies, current medications, past family history, past medical history, past social history, past surgical history and problem list     Review of Systems    Amazingly benign    Objective:      Pulse 86   Temp 99 9 °F (37 7 °C) (Tympanic)   Ht 5' 7" (1 702 m)   Wt 85 7 kg (189 lb)   SpO2 97%   BMI 29 60 kg/m²          Physical Exam   His blood pressures taken in the left arm and is approximately 90/70 in is fairly faint his heart sounds are unusual he can hear the wearing of the pump as well as some heart sounds the lungs are clear there is a short ejection murmur there is no edema he has dependent venous status is dermatitis and extensive purpura simplex    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

## 2018-10-10 ENCOUNTER — OFFICE VISIT (OUTPATIENT)
Dept: INTERNAL MEDICINE CLINIC | Facility: CLINIC | Age: 68
End: 2018-10-10
Payer: MEDICARE

## 2018-10-10 VITALS
WEIGHT: 189 LBS | SYSTOLIC BLOOD PRESSURE: 112 MMHG | TEMPERATURE: 98.7 F | DIASTOLIC BLOOD PRESSURE: 66 MMHG | BODY MASS INDEX: 29.66 KG/M2 | HEIGHT: 67 IN | OXYGEN SATURATION: 97 % | HEART RATE: 88 BPM

## 2018-10-10 DIAGNOSIS — J20.9 ACUTE BRONCHITIS, UNSPECIFIED ORGANISM: Primary | ICD-10-CM

## 2018-10-10 DIAGNOSIS — Z79.01 ON CONTINUOUS ORAL ANTICOAGULATION: ICD-10-CM

## 2018-10-10 DIAGNOSIS — N18.30 CHRONIC KIDNEY DISEASE, STAGE 3 (HCC): ICD-10-CM

## 2018-10-10 DIAGNOSIS — I25.5 CARDIOMYOPATHY, ISCHEMIC: ICD-10-CM

## 2018-10-10 PROCEDURE — 99214 OFFICE O/P EST MOD 30 MIN: CPT | Performed by: INTERNAL MEDICINE

## 2018-10-10 RX ORDER — FERROUS SULFATE 325(65) MG
1 TABLET ORAL
Refills: 3 | COMMUNITY
Start: 2018-09-04

## 2018-10-10 RX ORDER — AMOXICILLIN AND CLAVULANATE POTASSIUM 875; 125 MG/1; MG/1
1 TABLET, FILM COATED ORAL EVERY 12 HOURS SCHEDULED
Qty: 14 TABLET | Refills: 0 | Status: SHIPPED | OUTPATIENT
Start: 2018-10-10 | End: 2018-10-17

## 2018-10-10 NOTE — PROGRESS NOTES
Assessment/Plan:         Diagnoses and all orders for this visit:    Acute bronchitis, unspecified organism  -     XR chest pa & lateral; Future  -     amoxicillin-clavulanate (AUGMENTIN) 875-125 mg per tablet; Take 1 tablet by mouth every 12 (twelve) hours for 7 days    Cardiomyopathy, ischemic    On continuous oral anticoagulation    Patient understands to check his INR more closely for the next few days  while on antibiotic  Chronic kidney disease, stage 3 (HCC)    Other orders  -     ferrous sulfate 325 (65 Fe) mg tablet; Take 1 tablet by mouth daily with breakfast        Subjective:      Patient ID: Enedelia Hurt is a 70 y o  male  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 visit complaining of bronchitis the last 1 week  Started with upper respiratory symptoms now coughing up green phlegm  Occasional shortness of breath  Patient denies any fevers and chills  He has not gotten his flu shot yet  He was at his cardiologist's office to get his LVAD checked chest x-ray was ordered and Tessalon Perles prescribed  Chest x-ray was negative for any infiltrate  He is on Coumadin and his INR was 2 7 today  This is being managed through have a cardiology Coumadin Clinic  He has home monitoring device  The following portions of the patient's history were reviewed and updated as appropriate: allergies, current medications, past medical history, past social history, past surgical history and problem list     Review of Systems   Constitutional: Negative for chills and fever  Respiratory:        As above   Cardiovascular: Negative for chest pain  Objective:      /66 (BP Location: Left arm, Patient Position: Sitting, Cuff Size: Standard) Comment: difficult to hear  Pulse 88   Temp 98 7 °F (37 1 °C) (Tympanic)   Ht 5' 7" (1 702 m)   Wt 85 7 kg (189 lb)   SpO2 97%   BMI 29 60 kg/m²          Physical Exam   Constitutional: No distress     Sounds congested   Cardiovascular: Normal rate, regular rhythm and normal heart sounds  Pulmonary/Chest: Effort normal and breath sounds normal  No respiratory distress  He has no wheezes  He has no rales  Musculoskeletal: He exhibits no edema

## 2018-12-13 ENCOUNTER — OFFICE VISIT (OUTPATIENT)
Dept: INTERNAL MEDICINE CLINIC | Facility: CLINIC | Age: 68
End: 2018-12-13
Payer: MEDICARE

## 2018-12-13 VITALS — OXYGEN SATURATION: 96 % | BODY MASS INDEX: 30.04 KG/M2 | HEIGHT: 67 IN | WEIGHT: 191.4 LBS | TEMPERATURE: 96 F

## 2018-12-13 DIAGNOSIS — D64.9 ANEMIA, UNSPECIFIED TYPE: Primary | ICD-10-CM

## 2018-12-13 DIAGNOSIS — Z00.00 MEDICARE ANNUAL WELLNESS VISIT, SUBSEQUENT: ICD-10-CM

## 2018-12-13 DIAGNOSIS — Z23 FLU VACCINE NEED: ICD-10-CM

## 2018-12-13 PROCEDURE — G0439 PPPS, SUBSEQ VISIT: HCPCS | Performed by: INTERNAL MEDICINE

## 2018-12-13 PROCEDURE — G0008 ADMIN INFLUENZA VIRUS VAC: HCPCS

## 2018-12-13 PROCEDURE — 90662 IIV NO PRSV INCREASED AG IM: CPT

## 2018-12-13 PROCEDURE — 99213 OFFICE O/P EST LOW 20 MIN: CPT | Performed by: INTERNAL MEDICINE

## 2018-12-13 NOTE — PROGRESS NOTES
Assessment/Plan:    No problem-specific Assessment & Plan notes found for this encounter  Problem List Items Addressed This Visit     None            Subjective:      Patient ID: Jose Bashir is a 70 y o  male  are  HPI       care at [facility] and is a LVAD recipient  He continues getting very excellent meticulous low is here today for visit he actually is doing well   Georgi he is here today for visit he feels quite well he continues to receive excellent and meticulous care at [facility]   He has received an LVAD approximately 2 years ago and is doing quite well with the he also has an ICD these had for several years  He has some aortic regurgitation that is been followed carefully by echo  His hemoglobin was in the 9 and he was started on oral iron at [facility] and his most recent hemoglobin was in the 12  We had ordered a very ferritin B12 and folate level which apparently were never done is no longer short of breath nor he has chest pain but he is predictably easily fatigued  The following portions of the patient's history were reviewed and updated as appropriate: allergies, current medications, past family history, past medical history, past social history, past surgical history and problem list     Review of Systems      Objective:      Temp (!) 96 °F (35 6 °C)   Ht 5' 7" (1 702 m)   Wt 86 8 kg (191 lb 6 4 oz)   SpO2 96%   BMI 29 98 kg/m²          Physical Exam  are low appears well and in no distress he has extensive purpura simplex is result of his anticoagulation his palpable blood pressure was in the mid 80s his lungs are clear there is no significant edema when the auscultated heart the heart sounds are very distant heart here and he can hear the continuous were of his pump will order a B12 folate level and also ferritin  Maybe that because of the pump his low hemoglobin may be due to low-grade hemolysis    Will also give him a flu shot today no changes are made in his medications will see him back in a few months for general surveillance

## 2018-12-13 NOTE — PROGRESS NOTES
Assessment and Plan:    Problem List Items Addressed This Visit     None      Visit Diagnoses     Anemia, unspecified type    -  Primary    Relevant Orders    Vitamin B12    Ferritin    Folate    Flu vaccine need        Relevant Orders    PREFERRED: influenza vaccine, [id number], high-dose, PF 0 5 mL, for patients 65 yr+ (FLUZONE HIGH-DOSE) (Completed)    Medicare annual wellness visit, subsequent            Health Maintenance Due   Topic Date Due    Hepatitis C Screening  1950    Medicare Annual Wellness Visit (AWV)  1950    DTaP,Tdap,and Td Vaccines (1 - Tdap) 03/15/1970    Pneumococcal PPSV23/PCV13 65+ Years / Low and Medium Risk (2 of 2 - PPSV23) 02/06/2019         HPI:  Deep Tyler is a 70 y o  male here for his Subsequent Wellness Visit      Patient Active Problem List   Diagnosis    Acquired hypothyroidism    Aortic stenosis    Atherosclerotic heart disease of native coronary artery without angina pectoris    Benign essential hypertension    Cardiomyopathy, ischemic    Chronic kidney disease, stage 3 ([facility] [address] )    Hyperlipidemia    ICD (implantable cardioverter-defibrillator) in place    Vitamin D deficiency     Past Medical History:   Diagnosis Date    CHF (congestive heart failure) (HCC)     LAST ASSESSED [id number]    Post herpetic neuralgia     LAST ASSESSED [id number]    Rib fracture      Past Surgical History:   Procedure Laterality Date    CORONARY ARTERY BYPASS GRAFT  1993    LEFT VENTRICULAR ASSIST DEVICE       Family History   Problem Relation Age of Onset    No Known Problems Mother      History   Smoking Status    Former Smoker   Smokeless Tobacco    Never Used     History   Alcohol Use    Yes     Comment: SOCIAL       History   Drug Use No       Current Outpatient Prescriptions   Medication Sig Dispense Refill    amiodarone 200 mg tablet Take by mouth      ascorbic acid (VITAMIN C) 250 mg tablet Take 500 mg by mouth      aspirin 81 MG tablet Daily      atorvastatin (LIPITOR) 40 mg tablet Take by mouth      butalbital-aspirin-caffeine (FIORINAL) -40 mg capsule Take by mouth      Cholecalciferol (VITAMIN D) 2000 units CAPS Take by mouth      famotidine (PEPCID) 20 mg tablet Take 20 mg by mouth      ferrous sulfate 325 (65 Fe) mg tablet Take 1 tablet by mouth daily with breakfast  3    furosemide (LASIX) 40 mg tablet Take 40 mg by mouth      lisinopril (ZESTRIL) 5 mg tablet Take 5 mg by mouth      MULTIPLE VITAMINS ESSENTIAL PO Take by mouth      potassium chloride (K-DUR,KLOR-CON) 20 mEq tablet Take 40 mEq by mouth      sodium chloride (OCEAN) 0 65 % nasal spray 1 spray into each nostril      warfarin (COUMADIN) 4 mg tablet Take by mouth       No current facility-administered medications for this visit  Allergies   Allergen Reactions    Cefazolin      Immunization History   Administered Date(s) Administered    Influenza 11/01/2015, 10/01/2016, 12/09/2016, 03/23/2017    Influenza Quadrivalent Preservative Free 3 years and older IM 10/01/2016, 03/23/2017    Influenza Split High Dose Preservative Free IM 11/01/2015, 12/09/2016    Influenza TIV (IM) 12/01/2006    Influenza, high dose seasonal 0 5 mL 12/13/2018    Pneumococcal Conjugate 13-Valent 11/01/2015    Pneumococcal Polysaccharide PPV23 02/06/2014       Patient Care Team:  Rick Hannah MD as PCP - General  DO Rick Meehan MD    Medicare Screening Tests and Risk Assessments:      Health Risk Assessment:  Patient rates overall health as good  Patient feels that their physical health rating is Same  Eyesight was rated as Same  Hearing was rated as Same  Patient feels that their emotional and mental health rating is Same  Pain experienced by patient in the last 7 days has been None  Emotional/Mental Health:  Patient has been feeling nervous/anxious  PHQ-9 Depression Screening:    Frequency of the following problems over the past two weeks:      1   Little interest or pleasure in doing things: 0 - not at all      2  Feeling down, depressed, or hopeless: 0 - not at all  PHQ-2 Score: 0          Broken Bones/Falls: Fall Risk Assessment:    In the past year, patient has experienced: No history of falling in past year          Bladder/Bowel:  Patient has not leaked urine accidently in the last six months  Patient reports no loss of bowel control  Immunizations:  Patient has not had a flu vaccination within the last year  Patient has received a pneumonia shot  Patient has received a shingles shot  Home Safety:  Patient has trouble with stairs inside or outside of their home  Patient currently reports that there are no safety hazards present in home, working smoke alarms, no working carbon monoxide detectors  Preventative Screenings:   prostate cancer screen performed, cholesterol screen completed, glaucoma eye exam completed,     Nutrition:  Current diet: Low Cholesterol and Unhealthy with servings of the following:    Medications:  Patient is currently taking over-the-counter supplements  Patient is able to manage medications  Lifestyle Choices:  Patient reports no tobacco use  Patient has smoked or used tobacco in the past   Patient has stopped his tobacco use  Tobacco use quit date: [id number]  Patient reports alcohol use  Alcohol use per week: 1  Patient drives a vehicle  Activities of Daily Living:  Can get out of bed by his or her self, able to dress self, able to make own meals, able to do own shopping, able to bathe self, can do own laundry/housekeeping, can manage own money, pay bills and track expenses    Previous Hospitalizations:  No hospitalization or ED visit in past 12 months        Advanced Directives:  Patient has decided on a power of   Patient has spoken to designated power of   Patient has completed advanced directive

## 2019-01-25 ENCOUNTER — TELEPHONE (OUTPATIENT)
Dept: INTERNAL MEDICINE CLINIC | Facility: CLINIC | Age: 69
End: 2019-01-25

## 2019-01-25 NOTE — TELEPHONE ENCOUNTER
Pt wife asks for advice about readmitting  for 2nd procedure at [facility] ASAP: aortic valve replacement   Wife is distressed and wants your opinion today about quality of life, if possible [phone]

## 2019-04-23 ENCOUNTER — OFFICE VISIT (OUTPATIENT)
Dept: INTERNAL MEDICINE CLINIC | Facility: CLINIC | Age: 69
End: 2019-04-23
Payer: MEDICARE

## 2019-04-23 VITALS
BODY MASS INDEX: 28.09 KG/M2 | HEIGHT: 67 IN | TEMPERATURE: 98.7 F | WEIGHT: 179 LBS | HEART RATE: 70 BPM | OXYGEN SATURATION: 97 %

## 2019-04-23 DIAGNOSIS — Z95.810 ICD (IMPLANTABLE CARDIOVERTER-DEFIBRILLATOR) IN PLACE: ICD-10-CM

## 2019-04-23 DIAGNOSIS — I25.10 ATHEROSCLEROSIS OF NATIVE CORONARY ARTERY OF NATIVE HEART WITHOUT ANGINA PECTORIS: Primary | ICD-10-CM

## 2019-04-23 DIAGNOSIS — N18.30 CHRONIC KIDNEY DISEASE, STAGE 3 (HCC): ICD-10-CM

## 2019-04-23 PROCEDURE — 99214 OFFICE O/P EST MOD 30 MIN: CPT | Performed by: INTERNAL MEDICINE

## 2019-08-27 ENCOUNTER — OFFICE VISIT (OUTPATIENT)
Dept: INTERNAL MEDICINE CLINIC | Facility: CLINIC | Age: 69
End: 2019-08-27
Payer: MEDICARE

## 2019-08-27 VITALS
HEART RATE: 102 BPM | OXYGEN SATURATION: 96 % | HEIGHT: 67 IN | WEIGHT: 180.4 LBS | TEMPERATURE: 98.5 F | BODY MASS INDEX: 28.31 KG/M2

## 2019-08-27 DIAGNOSIS — N18.30 CHRONIC KIDNEY DISEASE, STAGE 3 (HCC): ICD-10-CM

## 2019-08-27 DIAGNOSIS — I10 BENIGN ESSENTIAL HYPERTENSION: ICD-10-CM

## 2019-08-27 DIAGNOSIS — I25.10 ATHEROSCLEROSIS OF NATIVE CORONARY ARTERY OF NATIVE HEART WITHOUT ANGINA PECTORIS: Primary | ICD-10-CM

## 2019-08-27 PROCEDURE — 99213 OFFICE O/P EST LOW 20 MIN: CPT | Performed by: INTERNAL MEDICINE

## 2019-08-27 NOTE — PROGRESS NOTES
Assessment/Plan:    No problem-specific Assessment & Plan notes found for this encounter  1  Atherosclerosis of native coronary artery of native heart without angina pectoris     2  Chronic kidney disease, stage 3 ([address] )     3  Benign essential hypertension          There are no diagnoses linked to this encounter  Subjective:      Patient ID: Raman  is a 78 y o  male  JOSE Pandey is here today for visit he actually looks great he is now 78years old he is a retired [employer] [place]  he received a left ventricular assist device 3 years ago more recently he had a tavr done at [facility] he is get a defibrillator in pacemaker and also had an ablation with the his device he has been withdrawn from diuretics and potassium and actually is faring quite well developed some epistaxis recently and has a inflated balloon in his right nostril which will be removed today  He otherwise is faring generally well    The following portions of the patient's history were reviewed and updated as appropriate: allergies, current medications, past family history, past medical history, past social history, past surgical history and problem list     Review of Systems      Objective:      Pulse 102   Temp 98 5 °F (36 9 °C) (Tympanic)   Ht 5' 7" (1 702 m)   Wt 81 8 kg (180 lb 6 4 oz)   SpO2 96%   BMI 28 25 kg/m²          Physical Exam  are low has extensive purpura simplex he is on aspirin and warfarin a he has a peculiar heart sounds consistent with a functioning LVAD  Taking his blood pressure is is quite adventure but I believe I can hear a bit of a noise at systolic pressure of about 90 he has a trace of edema his lungs are clear wound over his lab studies which were satisfactory are low actually looks great I have not seen him look this well in in a couple of years    Will give him some information about the shingles vaccine will see him back in a few months for general surveillance is my opinion that the care he has received for his cardiac condition has been exceptional      Current Outpatient Medications:     ascorbic acid (VITAMIN C) 250 mg tablet, Take 500 mg by mouth, Disp: , Rfl:     aspirin 81 MG tablet, Daily, Disp: , Rfl:     atorvastatin (LIPITOR) 40 mg tablet, Take by mouth, Disp: , Rfl:     butalbital-aspirin-caffeine (FIORINAL) -40 mg capsule, Take by mouth, Disp: , Rfl:     Cholecalciferol (VITAMIN D) 2000 units CAPS, Take by mouth, Disp: , Rfl:     ferrous sulfate 325 (65 Fe) mg tablet, Take 1 tablet by mouth daily with breakfast, Disp: , Rfl: 3    furosemide (LASIX) 40 mg tablet, Take 40 mg by mouth, Disp: , Rfl:     MULTIPLE VITAMINS ESSENTIAL PO, Take by mouth, Disp: , Rfl:     potassium chloride (K-DUR,KLOR-CON) 20 mEq tablet, Take 40 mEq by mouth, Disp: , Rfl:     warfarin (COUMADIN) 4 mg tablet, Take by mouth, Disp: , Rfl:     amiodarone 200 mg tablet, Take by mouth, Disp: , Rfl:     famotidine (PEPCID) 20 mg tablet, Take 20 mg by mouth, Disp: , Rfl:     lisinopril (ZESTRIL) 5 mg tablet, Take 5 mg by mouth, Disp: , Rfl:     sodium chloride (OCEAN) 0 65 % nasal spray, 1 spray into each nostril, Disp: , Rfl:     This note was completed in part utilizing [employer] Fluency Direct Software  Grammatical errors, random word insertions, spelling mistakes, and incomplete sentences can be an occasional consequence of this system secondary to software limitations, ambient noise, and hardware issues  If you have any question or concerns about the content, text, or information contained within the body of this dictation, please contact the provider for clarification

## 2020-01-06 ENCOUNTER — OFFICE VISIT (OUTPATIENT)
Dept: INTERNAL MEDICINE CLINIC | Facility: CLINIC | Age: 70
End: 2020-01-06
Payer: MEDICARE

## 2020-01-06 VITALS
HEIGHT: 67 IN | BODY MASS INDEX: 29.66 KG/M2 | HEART RATE: 90 BPM | OXYGEN SATURATION: 90 % | TEMPERATURE: 98.6 F | WEIGHT: 189 LBS

## 2020-01-06 DIAGNOSIS — Z00.00 MEDICARE ANNUAL WELLNESS VISIT, SUBSEQUENT: ICD-10-CM

## 2020-01-06 DIAGNOSIS — K43.2 INCISIONAL HERNIA, WITHOUT OBSTRUCTION OR GANGRENE: ICD-10-CM

## 2020-01-06 DIAGNOSIS — I25.10 ATHEROSCLEROSIS OF NATIVE CORONARY ARTERY WITHOUT ANGINA PECTORIS, UNSPECIFIED WHETHER NATIVE OR TRANSPLANTED HEART: ICD-10-CM

## 2020-01-06 DIAGNOSIS — S89.92XA LEFT KNEE INJURY, INITIAL ENCOUNTER: Primary | ICD-10-CM

## 2020-01-06 PROCEDURE — 1123F ACP DISCUSS/DSCN MKR DOCD: CPT | Performed by: INTERNAL MEDICINE

## 2020-01-06 PROCEDURE — G0439 PPPS, SUBSEQ VISIT: HCPCS | Performed by: INTERNAL MEDICINE

## 2020-01-06 PROCEDURE — 99214 OFFICE O/P EST MOD 30 MIN: CPT | Performed by: INTERNAL MEDICINE

## 2020-01-06 NOTE — PROGRESS NOTES
Assessment and Plan:     Problem List Items Addressed This Visit     None      Visit Diagnoses     Medicare annual wellness visit, subsequent    -  Primary           Preventive health issues were discussed with patient, and age appropriate screening tests were ordered as noted in patient's After Visit Summary  Personalized health advice and appropriate referrals for health education or preventive services given if needed, as noted in patient's After Visit Summary       History of Present Illness:     Patient presents for Medicare Annual Wellness visit    Patient Care Team:  Francisco J Kenney MD as PCP - General Tiffanie Gallagher, DO Francisco J Kenney MD     Problem List:     Patient Active Problem List   Diagnosis    Acquired hypothyroidism    Aortic stenosis    Atherosclerotic heart disease of native coronary artery without angina pectoris    Benign essential hypertension    Cardiomyopathy, ischemic    [facility]    Hyperlipidemia    ICD (implantable cardioverter-defibrillator) in place    Vitamin D deficiency      Past Medical and Surgical History:     Past Medical History:   Diagnosis Date    [facility]) [facility] )     LAST ASSESSED [id number]    Post herpetic neuralgia     LAST ASSESSED [id number]    Rib fracture      Past Surgical History:   Procedure Laterality Date    CORONARY ARTERY BYPASS GRAFT  1993    LEFT VENTRICULAR ASSIST DEVICE        Family History:     Family History   Problem Relation Age of Onset    No Known Problems Mother       Social History:     Social History     Socioeconomic History    Marital status: /Civil Union     Spouse name: None    Number of children: None    Years of education: None    Highest education level: None   Occupational History    None   Social Needs    Financial resource strain: None    Food insecurity:     Worry: None     Inability: None    Transportation needs:     Medical: None     Non-medical: None Tobacco Use    Smoking status: Former Smoker    Smokeless tobacco: Never Used   Substance and Sexual Activity    Alcohol use: Yes     Comment: SOCIAL     Drug use: No    Sexual activity: None   Lifestyle    Physical activity:     Days per week: None     Minutes per session: None    Stress: None   Relationships    Social connections:     Talks on phone: None     Gets together: None     Attends [employer] service: None     Active member of club or organization: None     Attends meetings of clubs or organizations: None     Relationship status: None    Intimate partner violence:     Fear of current or ex partner: None     Emotionally abused: None     Physically abused: None     Forced sexual activity: None   Other Topics Concern    None   Social History Narrative    None       Medications and Allergies:     Current Outpatient Medications   Medication Sig Dispense Refill    amiodarone 200 mg tablet Take 400 mg by mouth       ascorbic acid (VITAMIN C) 250 mg tablet Take 500 mg by mouth      atorvastatin (LIPITOR) 40 mg tablet Take by mouth      butalbital-aspirin-caffeine (FIORINAL) -40 mg capsule Take by mouth      Cholecalciferol (VITAMIN D) 2000 units CAPS Take by mouth      ferrous sulfate 325 (65 Fe) mg tablet Take 1 tablet by mouth daily with breakfast  3    furosemide (LASIX) 40 mg tablet Take 20 mg by mouth       metoprolol tartrate (LOPRESSOR) 25 mg tablet Take 25 mg by mouth 2 (two) times a day      MULTIPLE VITAMINS ESSENTIAL PO Take by mouth      potassium chloride (K-DUR,KLOR-CON) 20 mEq tablet Take 20 mEq by mouth       sodium chloride (OCEAN) 0 65 % nasal spray 1 spray into each nostril      warfarin (COUMADIN) 4 mg tablet Take by mouth      famotidine (PEPCID) 20 mg tablet Take 20 mg by mouth      lisinopril (ZESTRIL) 5 mg tablet Take 5 mg by mouth       No current facility-administered medications for this visit        Allergies   Allergen Reactions    Cefazolin Immunizations:     Immunization History   Administered Date(s) Administered    INFLUENZA 11/01/2013, 10/01/2014, 10/01/2015, 11/01/2015, 10/01/2016, 12/09/2016, 03/23/2017    Influenza Quadrivalent Preservative Free 3 years and older IM 10/01/2016, 03/23/2017    Influenza Split High Dose Preservative Free IM 11/01/2015, 12/09/2016    Influenza TIV (IM) 12/01/2006    Influenza, high dose seasonal 0 5 mL 12/13/2018    Pneumococcal Conjugate 13-Valent 11/01/2015    Pneumococcal Polysaccharide PPV23 02/06/2014      Health Maintenance:         Topic Date Due    Hepatitis C Screening  1950    CRC Screening: Colonoscopy  07/20/2026         Topic Date Due    Hepatitis B Vaccine (1 of 3 - Risk 3-dose series) 03/15/1968    Pneumococcal Vaccine: 65+ Years (2 of 2 - PPSV23) 02/06/2019    Influenza Vaccine  07/01/2019      Medicare Health Risk Assessment:     Pulse 90   Temp 98 6 °F (37 °C) (Tympanic)   Ht 5' 7" (1 702 m)   Wt 85 7 kg (189 lb)   SpO2 90%   BMI 29 60 kg/m²      Georgi is here for his Subsequent Wellness visit  Health Risk Assessment:   Patient rates overall health as good  Patient feels that their physical health rating is same  Eyesight was rated as same  Hearing was rated as same  Patient feels that their emotional and mental health rating is same  Pain experienced in the last 7 days has been some  Patient's pain rating has been 8/10  Patient states that he has experienced no weight loss or gain in last 6 months  Depression Screening:   PHQ-2 Score: 1      Fall Risk Screening: In the past year, patient has experienced: no history of falling in past year      Home Safety:  Patient has trouble with stairs inside or outside of their home  Patient has working smoke alarms and has working carbon monoxide detector  Nutrition:   Current diet is Low Cholesterol and No Added Salt  Medications:   Patient is currently taking over-the-counter supplements   OTC medications include: see medication list  Patient is able to manage medications  Activities of Daily Living (ADLs)/Instrumental Activities of Daily Living (IADLs):   Walk and transfer into and out of bed and chair?: Yes  Dress and groom yourself?: Yes    Bathe or shower yourself?: Yes    Feed yourself? Yes  Do your laundry/housekeeping?: Yes  Manage your money, pay your bills and track your expenses?: Yes  Make your own meals?: Yes    Do your own shopping?: Yes    Previous Hospitalizations:   Any hospitalizations or ED visits within the last 12 months?: Yes    How many hospitalizations have you had in the last year?: 3-4    Advance Care Planning:   Living will: Yes    Durable POA for healthcare:  Yes    Advanced directive: Yes      Cognitive Screening:   Provider or family/friend/caregiver concerned regarding cognition?: Yes    PREVENTIVE SCREENINGS      Cardiovascular Screening:    General: Screening Not Indicated and History Lipid Disorder      Colorectal Cancer Screening:     General: Screening Current      Abdominal Aortic Aneurysm (AAA) Screening:    Risk factors include: age between 73-69 yo and tobacco use        Ariel Sever, MD

## 2020-01-06 NOTE — PROGRESS NOTES
Assessment/Plan:     Diagnoses and all orders for this visit:    Left knee injury, initial encounter    Medicare annual wellness visit, subsequent    Incisional hernia, without obstruction or gangrene    Atherosclerosis of native coronary artery without angina pectoris, unspecified whether native or transplanted heart  -     warfarin (COUMADIN) 4 mg tablet; Take 1 tablet (4 mg total) by mouth 2 (two) times a day    Other orders  -     metoprolol tartrate (LOPRESSOR) 25 mg tablet; Take 25 mg by mouth 2 (two) times a day          Subjective:      Patient ID: Megha Balderas is a 78 y o  male  HPI   Guzman Loja is here today for visit the visit was prompted by a mishap that he had at home he was climbing down from a ladder doing something with [employer] decorations he was already standing on the floor but apparently lost his footing and fell and banged his knee and shin  This happened approximately 2 weeks ago he feels that it is a little bit better but is concerned that the knee might need to be tapped with that there might be a fracture    The following portions of the patient's history were reviewed and updated as appropriate: allergies, current medications, past family history, past medical history, past social history, past surgical history and problem list     Review of Systems      Objective:      Pulse 90   Temp 98 6 °F (37 °C) (Tympanic)   Ht 5' 7" (1 702 m)   Wt 85 7 kg (189 lb)   SpO2 90%   BMI 29 60 kg/m²      BP Readings from Last 3 Encounters:   10/10/18 112/66   04/03/18 100/80   08/02/16 130/60      Wt Readings from Last 3 Encounters:   01/06/20 85 7 kg (189 lb)   08/27/19 81 8 kg (180 lb 6 4 oz)   04/23/19 81 2 kg (179 lb)      BMI: Estimated body mass index is 29 6 kg/m² as calculated from the following:    Height as of this encounter: 5' 7" (1 702 m)  Weight as of this encounter: 85 7 kg (189 lb)      BSA: Estimated body surface area is 1 97 meters squared as calculated from the following: Height as of this encounter: 5' 7" (1 702 m)  Weight as of this encounter: 85 7 kg (189 lb)  Physical Exam  Georgi has an LVAD in place  For this reason we did not attempt his blood pressure we did look at his knee and leg and abdominal wall he has an area in size incisional hernia in the left lower quadrant of his abdomen  This was already evaluated by Dr Dallas valadez of the surgical service at [facility] and who did not wish to operate at this time the left knee has a superficial bruise there is no fluid it is cool I do not feel anything in the popliteal fossa and the lower leg orthopedic we appears unremarkable    He has been under the care of [facility] I recommend that he have a visit with them for anticipated radiographs and examination      Current Outpatient Medications:     amiodarone 200 mg tablet, Take 400 mg by mouth , Disp: , Rfl:     ascorbic acid (VITAMIN C) 250 mg tablet, Take 500 mg by mouth, Disp: , Rfl:     atorvastatin (LIPITOR) 40 mg tablet, Take by mouth, Disp: , Rfl:     butalbital-aspirin-caffeine (FIORINAL) -40 mg capsule, Take by mouth, Disp: , Rfl:     Cholecalciferol (VITAMIN D) 2000 units CAPS, Take by mouth, Disp: , Rfl:     ferrous sulfate 325 (65 Fe) mg tablet, Take 1 tablet by mouth daily with breakfast, Disp: , Rfl: 3    furosemide (LASIX) 40 mg tablet, Take 20 mg by mouth , Disp: , Rfl:     metoprolol tartrate (LOPRESSOR) 25 mg tablet, Take 25 mg by mouth 2 (two) times a day, Disp: , Rfl:     MULTIPLE VITAMINS ESSENTIAL PO, Take by mouth, Disp: , Rfl:     potassium chloride (K-DUR,KLOR-CON) 20 mEq tablet, Take 20 mEq by mouth , Disp: , Rfl:     sodium chloride (OCEAN) 0 65 % nasal spray, 1 spray into each nostril, Disp: , Rfl:     warfarin (COUMADIN) 4 mg tablet, Take by mouth, Disp: , Rfl:     famotidine (PEPCID) 20 mg tablet, Take 20 mg by mouth, Disp: , Rfl:     lisinopril (ZESTRIL) 5 mg tablet, Take 5 mg by mouth, Disp: , Rfl:     This note was completed in part utilizing [employer] Flu[employer] Direct Software  Grammatical errors, random word insertions, spelling mistakes, and incomplete sentences can be an occasional consequence of this system secondary to software limitations, ambient noise, and hardware issues  If you have any question or concerns about the content, text, or information contained within the body of this dictation, please contact the provider for clarification

## 2020-01-06 NOTE — PATIENT INSTRUCTIONS

## 2020-01-07 RX ORDER — WARFARIN SODIUM 4 MG/1
4 TABLET ORAL
Qty: 60 TABLET | Refills: 3 | Status: SHIPPED | OUTPATIENT
Start: 2020-01-07

## 2020-11-03 ENCOUNTER — TELEPHONE (OUTPATIENT)
Dept: INTERNAL MEDICINE CLINIC | Facility: CLINIC | Age: 70
End: 2020-11-03

## 2020-11-06 ENCOUNTER — TELEPHONE (OUTPATIENT)
Dept: INTERNAL MEDICINE CLINIC | Facility: CLINIC | Age: 70
End: 2020-11-06

## 2020-11-06 DIAGNOSIS — I25.5 CARDIOMYOPATHY, ISCHEMIC: ICD-10-CM

## 2020-11-06 DIAGNOSIS — I25.10 ATHEROSCLEROSIS OF NATIVE CORONARY ARTERY WITHOUT ANGINA PECTORIS, UNSPECIFIED WHETHER NATIVE OR TRANSPLANTED HEART: Primary | ICD-10-CM

## 2020-11-06 DIAGNOSIS — N18.30 STAGE 3 CHRONIC KIDNEY DISEASE, UNSPECIFIED WHETHER STAGE 3A OR 3B CKD (HCC): ICD-10-CM
